# Patient Record
Sex: FEMALE | Race: BLACK OR AFRICAN AMERICAN | NOT HISPANIC OR LATINO | Employment: FULL TIME | URBAN - METROPOLITAN AREA
[De-identification: names, ages, dates, MRNs, and addresses within clinical notes are randomized per-mention and may not be internally consistent; named-entity substitution may affect disease eponyms.]

---

## 2024-05-30 ENCOUNTER — OFFICE VISIT (OUTPATIENT)
Dept: OBSTETRICS AND GYNECOLOGY | Facility: CLINIC | Age: 54
End: 2024-05-30
Payer: COMMERCIAL

## 2024-05-30 VITALS
DIASTOLIC BLOOD PRESSURE: 77 MMHG | HEART RATE: 91 BPM | WEIGHT: 209 LBS | HEIGHT: 61 IN | BODY MASS INDEX: 39.46 KG/M2 | SYSTOLIC BLOOD PRESSURE: 128 MMHG

## 2024-05-30 DIAGNOSIS — N32.81 OVERACTIVE BLADDER: ICD-10-CM

## 2024-05-30 DIAGNOSIS — N39.9 URINARY DISORDER: Primary | ICD-10-CM

## 2024-05-30 DIAGNOSIS — Z12.31 VISIT FOR SCREENING MAMMOGRAM: ICD-10-CM

## 2024-05-30 LAB
POC APPEARANCE, URINE: CLEAR
POC BILIRUBIN, URINE: NEGATIVE
POC BLOOD, URINE: NEGATIVE
POC COLOR, URINE: YELLOW
POC GLUCOSE, URINE: NEGATIVE MG/DL
POC KETONES, URINE: NEGATIVE MG/DL
POC LEUKOCYTES, URINE: NEGATIVE
POC NITRITE,URINE: NEGATIVE
POC PH, URINE: 6 PH
POC PROTEIN, URINE: NEGATIVE MG/DL
POC SPECIFIC GRAVITY, URINE: 1.02
POC UROBILINOGEN, URINE: 0.2 EU/DL

## 2024-05-30 PROCEDURE — 51798 US URINE CAPACITY MEASURE: CPT | Performed by: OBSTETRICS & GYNECOLOGY

## 2024-05-30 PROCEDURE — 99204 OFFICE O/P NEW MOD 45 MIN: CPT | Performed by: OBSTETRICS & GYNECOLOGY

## 2024-05-30 PROCEDURE — 99214 OFFICE O/P EST MOD 30 MIN: CPT | Performed by: OBSTETRICS & GYNECOLOGY

## 2024-05-30 PROCEDURE — 81003 URINALYSIS AUTO W/O SCOPE: CPT | Performed by: OBSTETRICS & GYNECOLOGY

## 2024-05-30 RX ORDER — ESOMEPRAZOLE MAGNESIUM 40 MG/1
CAPSULE, DELAYED RELEASE ORAL
COMMUNITY
Start: 2015-04-16

## 2024-05-30 RX ORDER — FLUTICASONE PROPIONATE 50 MCG
2 SPRAY, SUSPENSION (ML) NASAL DAILY
COMMUNITY
Start: 2024-05-21

## 2024-05-30 RX ORDER — AZELASTINE HYDROCHLORIDE 0.5 MG/ML
SOLUTION/ DROPS OPHTHALMIC
COMMUNITY
Start: 2024-05-21

## 2024-05-30 RX ORDER — OMEPRAZOLE 40 MG/1
CAPSULE, DELAYED RELEASE ORAL
COMMUNITY

## 2024-05-30 RX ORDER — SOLIFENACIN SUCCINATE 5 MG/1
5 TABLET, FILM COATED ORAL DAILY
Qty: 30 TABLET | Refills: 3 | Status: SHIPPED | OUTPATIENT
Start: 2024-05-30 | End: 2025-05-30

## 2024-05-30 ASSESSMENT — PAIN SCALES - GENERAL: PAINLEVEL: 0-NO PAIN

## 2024-05-31 ASSESSMENT — ENCOUNTER SYMPTOMS
GASTROINTESTINAL NEGATIVE: 1
RESPIRATORY NEGATIVE: 1
EYES NEGATIVE: 1
UNEXPECTED WEIGHT CHANGE: 1
ENDOCRINE NEGATIVE: 1
MUSCULOSKELETAL NEGATIVE: 1
PSYCHIATRIC NEGATIVE: 1
CARDIOVASCULAR NEGATIVE: 1
NEUROLOGICAL NEGATIVE: 1

## 2024-05-31 NOTE — PROGRESS NOTES
Urogynecology  Provider:  Cari Mendoza MD  [phone]    ASSESSMENT AND PLAN:   53 year old female with OAB.     Diagnoses:  #1 Overactive bladder   #2 Breast cancer screening   #3 Myofascial pelvic pain    Plan:  1. OAB  - PVR = 5mL by bladder U/S.   - Rx for Solifenacin 5 mg sent to the patient's preferred pharmacy.  - Discussed potential side effects of Solifenacin, including dry mouth and constipation.  - Advised to continue PFPT to strengthen the pelvic floor.  - We discussed alternative treatment methods like intradetrusor Botox injections and SNM.    2. Breast cancer screening   - Bilateral mammogram with tomosynthesis requisition sent today and this order expires by 7/30/2025.     3. Myofascial pelvic pain  - We discussed the nature of her myofascial pelvic pain.  - Continue with PFPT.    Follow up in 2 months for a medication review and annual exam, including a Pap smear with Tanika JASSO-CNP.    Scribe Attestation  By signing my name below, IMichael Scribe, attest that this documentation has been prepared under the direction and in the presence of Cari Mendoza MD on 05/30/2024 at 4:35 PM.     Agree with above. I Dr. Mendoza, personally performed the services described in the documentation which was scribed virtually and confirm it is both complete and accurate.  Cari Mendoza MD        Problem List Items Addressed This Visit    None  Visit Diagnoses       Urinary disorder    -  Primary    Relevant Orders    Measure post void residual (Completed)    POCT UA Automated manually resulted (Completed)    Visit for screening mammogram        Relevant Orders    BI mammo bilateral screening tomosynthesis    Overactive bladder        Relevant Medications    solifenacin (Vesicare) 5 mg tablet                I spent a total of 45 minutes in face to face and non face to face time. Self referred. Saw us last in 2017.     Cari Mendoza MD      HISTORY OF PRESENT ILLNESS:   53  year old female presenting as a new patient referral for urinary incontinence.     Interval History:  - She was previously seen by Dr. Lyn who referred her to this clinic.    - She mentioned she recently started Saxenda for weight loss and noted that a previous weight loss medication, Phentermine, seemed to reduce her urgency sx.     Urinary Symptoms:   - She has issues with ongoing urinary urgency.  - She reports that the urgency has not worsened but remains a significant issue.  - She describes the urgency as a sudden, intense need to urinate, often triggered by activities such as carrying groceries.  - She often has to rush to the bathroom and sometimes does not make it in time.  - She has a hx of similar symptoms dating back to , when she was initially evaluated and treated with PFPT due to a tight pelvic floor.  - She has been managing her sx without medication but is now considering pharmacological intervention due to the persistent nature of her sx.     Past Medical History:  - Bedwetting as a child.  - Heartburn.    Past Surgical History:  -  section.  - Gallbladder removal.  - D&C.  - Urethral dilation.  - Esophageal dilation.  - One surgical termination.    Family History:  - Sister with addiction issues.  - No significant family hx of urinary or pelvic floor disorders.    Social History:  - Works as a .  - Lives with nieces, whom she is raising due to her sister's addiction issues.  - Drinks 1 cup of coffee and 3 bottles of water today.    OBGYN History Sexual Activity:   - She had 3 pregnancies and 1 vaginal delivery.  - She is not currently sexually active.       Past Medical History:    - Takes Saxenda for weight loss   Past Medical History:   Diagnosis Date    Allergy status to unspecified drugs, medicaments and biological substances     History of seasonal allergies    Personal history of other diseases of the digestive system     History of gastroesophageal reflux  "(GERD)       Past Surgical History:     Past Surgical History:   Procedure Laterality Date     SECTION, LOW TRANSVERSE  2015     Section Low Transverse    CHOLECYSTECTOMY  2015    Cholecystectomy Laparoscopic    DILATION AND CURETTAGE OF UTERUS  2015    Dilation And Curettage    GALLBLADDER SURGERY  2018    Gallbladder Surgery    OTHER SURGICAL HISTORY  2015    Surgically Induced  - By Dilation And Curettage    OTHER SURGICAL HISTORY  2015    Esophageal Dilation         Medications:       Prior to Admission medications    Not on File        ROS  Review of Systems   Constitutional:  Positive for unexpected weight change.   HENT: Negative.     Eyes: Negative.    Respiratory: Negative.     Cardiovascular: Negative.    Gastrointestinal: Negative.    Endocrine: Negative.    Genitourinary:  Positive for urgency.   Musculoskeletal: Negative.    Allergic/Immunologic: Positive for environmental allergies.   Neurological: Negative.    Psychiatric/Behavioral: Negative.            PHYSICAL EXAM:      /77   Pulse 91   Ht 1.549 m (5' 1\")   Wt 94.8 kg (209 lb)   BMI 39.49 kg/m²      No LMP recorded.      Declines chaperone for physical exam.    PVR= 5 mL US    Well developed, well nourished, in no apparent distress.   Neurologic/Psychiatric:  Awake, Alert and Oriented times 3.  Affect normal. Normal cranial nerves  Pulm: breathing without effort  Sexual maturity: Angelito stage V  Abd exam: soft, non-tender      GENITAL/URINARY:       External Genitalia:  The patient has normal appearing external genitalia, normal skenes and bartholins glands, and a normal hair distribution.  Her vulva is without lesions, erythema or discharge.  It is non-tender with appropriate sensation.     Urethral Meatus:  Size normal, Location normal, Lesions absent, Prolapse absent,      Urethra:  Fullness absent, Masses absent,      Bladder:  Fullness absent, Masses absent, Tenderness " absent,      Vagina:  General appearance normal, Estrogen effect normal, Discharge absent, Lesions absent,    NO POP  Cervix: Normal, no discharge.   Uterus:  normal size and mobile  Adnexa:   bilateral normal    Anus/Perineum:  Lesions absent and Masses absent normal sphincter tone, no lesions  No Hemorrhoids, Normal Perineum      She does have myofascial tenderness on exam.         Cari Mendoza MD

## 2024-06-13 ENCOUNTER — OFFICE VISIT (OUTPATIENT)
Dept: OBSTETRICS AND GYNECOLOGY | Facility: CLINIC | Age: 54
End: 2024-06-13
Payer: COMMERCIAL

## 2024-06-13 VITALS
HEART RATE: 84 BPM | WEIGHT: 208 LBS | DIASTOLIC BLOOD PRESSURE: 63 MMHG | SYSTOLIC BLOOD PRESSURE: 100 MMHG | HEIGHT: 61 IN | BODY MASS INDEX: 39.27 KG/M2

## 2024-06-13 DIAGNOSIS — Z01.419 WELL WOMAN EXAM: Primary | ICD-10-CM

## 2024-06-13 DIAGNOSIS — N32.81 OVERACTIVE BLADDER: ICD-10-CM

## 2024-06-13 DIAGNOSIS — Z12.4 CERVICAL CANCER SCREENING: ICD-10-CM

## 2024-06-13 PROCEDURE — 99396 PREV VISIT EST AGE 40-64: CPT | Performed by: NURSE PRACTITIONER

## 2024-06-13 ASSESSMENT — ENCOUNTER SYMPTOMS
ENDOCRINE NEGATIVE: 1
GASTROINTESTINAL NEGATIVE: 1
PSYCHIATRIC NEGATIVE: 1
CONSTITUTIONAL NEGATIVE: 1
EYES NEGATIVE: 1
NEUROLOGICAL NEGATIVE: 1
RESPIRATORY NEGATIVE: 1
CARDIOVASCULAR NEGATIVE: 1
MUSCULOSKELETAL NEGATIVE: 1

## 2024-06-13 ASSESSMENT — PAIN SCALES - GENERAL: PAINLEVEL: 0-NO PAIN

## 2024-06-13 NOTE — PROGRESS NOTES
"Martina Swanson \"Jeor" is a 53 y.o. female who is here for a routine exam.        HPI    Records Review:  - Saw Dr. Mendoza May 30, 2024 for OAB. She was prescribed Solifenacin 5mg. She was doing PFPT.     Urinary Symptoms:  - She has been undergoing PFPT, which has been beneficial.  - She reports that her bladder sx have improved significantly with the medication, particularly noting a reduction in nocturia and urgency.  - She denies any issues with dry mouth, although she is taking Saxenda for weight management, which she started recently.  - She mentions retaining water and has been referred to a cardiologist after an EKG showed a minor irregularity.  - She denies incontinence severe enough to wear pads.    GYN Symptoms:  - She experiences occasional night sweats and hot flashes, but she manages them without intervention.  - She is post-menopausal and not currently sexually active.  - She denies any vaginal dryness or irritation.  - She denies any breast issues.    Review of Systems   Constitutional: Negative.    HENT: Negative.     Eyes: Negative.    Respiratory: Negative.     Cardiovascular: Negative.    Gastrointestinal: Negative.    Endocrine: Negative.    Genitourinary:         Improved bladder control, no nocturia, no urgency, no incontinence requiring pads   Musculoskeletal: Negative.    Neurological: Negative.    Psychiatric/Behavioral: Negative.         Objective   /63   Pulse 84   Ht 1.549 m (5' 1\")   Wt 94.3 kg (208 lb)   BMI 39.30 kg/m²     General:   alert, appears stated age, and cooperative   Heart: regular rate and rhythm, S1, S2 normal, no murmur, click, rub or gallop   Lungs: clear to auscultation bilaterally   Abdomen: soft, non-tender, without masses or organomegaly   Vulva: normal   Vagina: normal mucosa, normal discharge   Cervix: Appears closed   Uterus: normal size, mobile   Adnexa: normal adnexa     Assessment/Plan   53 y.o. female presenting for yearly " exam.    Diagnoses:  #1 OAB  #2 Well woman exam    Plan:  OAB  - Sx well-controlled with Solifenacin 5 mg. Continue current medication.    2. Well-woman exam  - Mammogram order previously sent by Dr. Mendoza.  - Speculum exam performed and pap collected today. We will call the patient in 2+ weeks with the cytology report if results are abnormal and discussed that results will be uploaded to the patient portal to review after they have been resulted by the pathologist.     Return in 1 year for next annual exam with JOSE F Staton.    Scribe Attestation  By signing my name below, Michael TORRES Scribe, attest that this documentation has been prepared under the direction and in the presence of JOSE F Flannery on 06/13/2024 at 1:43 PM.    I, JOSE F Flannery, personally performed the services described in this documentation which was scribed virtually and I confirm that it is both accurate and complete.       JOSE F Flannery

## 2024-06-26 LAB
CYTOLOGY CMNT CVX/VAG CYTO-IMP: NORMAL
HPV HR 12 DNA GENITAL QL NAA+PROBE: NEGATIVE
HPV HR GENOTYPES PNL CVX NAA+PROBE: NEGATIVE
HPV16 DNA SPEC QL NAA+PROBE: NEGATIVE
HPV18 DNA SPEC QL NAA+PROBE: NEGATIVE
LAB AP HPV GENOTYPE QUESTION: YES
LAB AP HPV HR: NORMAL
LABORATORY COMMENT REPORT: NORMAL
MENSTRUAL HX REPORTED: NORMAL
PATH REPORT.RELEVANT HX SPEC: NORMAL
PATH REPORT.TOTAL CANCER: NORMAL

## 2024-06-27 ENCOUNTER — HOSPITAL ENCOUNTER (OUTPATIENT)
Dept: RADIOLOGY | Facility: CLINIC | Age: 54
Discharge: HOME | End: 2024-06-27
Payer: COMMERCIAL

## 2024-06-27 VITALS — WEIGHT: 209 LBS | BODY MASS INDEX: 39.46 KG/M2 | HEIGHT: 61 IN

## 2024-06-27 DIAGNOSIS — Z12.31 VISIT FOR SCREENING MAMMOGRAM: ICD-10-CM

## 2024-06-27 PROCEDURE — 77067 SCR MAMMO BI INCL CAD: CPT

## 2024-07-29 ENCOUNTER — APPOINTMENT (OUTPATIENT)
Dept: OBSTETRICS AND GYNECOLOGY | Facility: CLINIC | Age: 54
End: 2024-07-29
Payer: COMMERCIAL

## 2024-11-19 DIAGNOSIS — N32.81 OVERACTIVE BLADDER: ICD-10-CM

## 2024-11-19 NOTE — TELEPHONE ENCOUNTER
Request received for   Requested Prescriptions     Pending Prescriptions Disp Refills    solifenacin (VESIcare) 5 mg tablet [Pharmacy Med Name: Solifenacin Succinate Oral Tablet 5 MG] 30 tablet 0     Sig: TAKE 1 TABLET BY MOUTH ONCE DAILY (SWALLOW WHOLE. DO NOT CRUSH, CHEW OR SPLIT)       Mihaela Swanson was last seen 6/13/2024.

## 2024-11-20 RX ORDER — SOLIFENACIN SUCCINATE 5 MG/1
TABLET, FILM COATED ORAL
Qty: 30 TABLET | Refills: 0 | Status: SHIPPED | OUTPATIENT
Start: 2024-11-20

## 2025-04-29 DIAGNOSIS — N32.81 OVERACTIVE BLADDER: ICD-10-CM

## 2025-04-29 RX ORDER — SOLIFENACIN SUCCINATE 5 MG/1
TABLET, FILM COATED ORAL
Qty: 30 TABLET | Refills: 0 | Status: SHIPPED | OUTPATIENT
Start: 2025-04-29

## 2025-04-29 NOTE — TELEPHONE ENCOUNTER
Request received for   Requested Prescriptions     Pending Prescriptions Disp Refills    solifenacin (VESIcare) 5 mg tablet [Pharmacy Med Name: Solifenacin Succinate Oral Tablet 5 MG] 30 tablet 0     Sig: TAKE ONE TABLET BY MOUTH ONCE DAILY (SWALLOW WHOLE. DO NOT CRUSH, CHEW OR SPLIT)       Mihaela Swanson was last seen 06/13/2024.

## 2025-06-12 DIAGNOSIS — N32.81 OVERACTIVE BLADDER: ICD-10-CM

## 2025-06-16 ENCOUNTER — APPOINTMENT (OUTPATIENT)
Dept: OBSTETRICS AND GYNECOLOGY | Facility: CLINIC | Age: 55
End: 2025-06-16
Payer: COMMERCIAL

## 2025-06-19 RX ORDER — SOLIFENACIN SUCCINATE 5 MG/1
TABLET, FILM COATED ORAL
Qty: 30 TABLET | Refills: 0 | Status: SHIPPED | OUTPATIENT
Start: 2025-06-19

## 2025-06-23 ENCOUNTER — APPOINTMENT (OUTPATIENT)
Dept: OBSTETRICS AND GYNECOLOGY | Facility: CLINIC | Age: 55
End: 2025-06-23
Payer: COMMERCIAL

## 2025-06-23 VITALS
BODY MASS INDEX: 33.46 KG/M2 | SYSTOLIC BLOOD PRESSURE: 111 MMHG | WEIGHT: 177.2 LBS | HEIGHT: 61 IN | DIASTOLIC BLOOD PRESSURE: 73 MMHG | HEART RATE: 83 BPM

## 2025-06-23 DIAGNOSIS — N32.81 OVERACTIVE BLADDER: ICD-10-CM

## 2025-06-23 DIAGNOSIS — Z12.31 BREAST CANCER SCREENING BY MAMMOGRAM: ICD-10-CM

## 2025-06-23 DIAGNOSIS — Z01.419 WELL WOMAN EXAM: Primary | ICD-10-CM

## 2025-06-23 PROCEDURE — 99396 PREV VISIT EST AGE 40-64: CPT | Performed by: NURSE PRACTITIONER

## 2025-06-23 PROCEDURE — 3008F BODY MASS INDEX DOCD: CPT | Performed by: NURSE PRACTITIONER

## 2025-06-23 RX ORDER — SOLIFENACIN SUCCINATE 5 MG/1
5 TABLET, FILM COATED ORAL DAILY
Qty: 90 TABLET | Refills: 3 | Status: SHIPPED | OUTPATIENT
Start: 2025-06-23 | End: 2026-06-23

## 2025-06-23 ASSESSMENT — ENCOUNTER SYMPTOMS
ALLERGIC/IMMUNOLOGIC NEGATIVE: 1
ENDOCRINE NEGATIVE: 1
HEMATOLOGIC/LYMPHATIC NEGATIVE: 1
NEUROLOGICAL NEGATIVE: 1
CONSTITUTIONAL NEGATIVE: 1
MUSCULOSKELETAL NEGATIVE: 1
CARDIOVASCULAR NEGATIVE: 1
EYES NEGATIVE: 1
RESPIRATORY NEGATIVE: 1
GASTROINTESTINAL NEGATIVE: 1
PSYCHIATRIC NEGATIVE: 1

## 2025-06-23 ASSESSMENT — PAIN SCALES - GENERAL: PAINLEVEL_OUTOF10: 0-NO PAIN

## 2025-06-23 NOTE — PROGRESS NOTES
"Martina Swanson \"Jero" is a 54 y.o. female who is here for a routine exam.     Current contraception: abstinence  History of abnormal Pap smear: no  Family history of uterine or ovarian cancer: no  History of abnormal mammogram: no  Family history of breast cancer: yes - mother's sister  History of abnormal lipids: no  Menstrual History:  OB History          1    Para   1    Term   1            AB        Living             SAB        IAB        Ectopic        Multiple        Live Births                    No LMP recorded. Patient is perimenopausal.       HPI  Reports Solifenacin continues to be helpful at managing urinary urgency. Denies vaginal burning or itching.     Records Review  - Hx of OAB, Solifenacin 5 mg was helpful during last visit in 2024.  - 2024 normal mammogram and pap smear.      Review of Systems   Constitutional: Negative.    HENT: Negative.     Eyes: Negative.    Respiratory: Negative.     Cardiovascular: Negative.    Gastrointestinal: Negative.    Endocrine: Negative.    Genitourinary: Negative.    Musculoskeletal: Negative.    Skin: Negative.    Allergic/Immunologic: Negative.    Neurological: Negative.    Hematological: Negative.    Psychiatric/Behavioral: Negative.         Objective   /73 (BP Location: Left arm, Patient Position: Sitting)   Pulse 83   Ht 1.549 m (5' 1\")   Wt 80.4 kg (177 lb 3.2 oz)   BMI 33.48 kg/m²     Physical Exam  Constitutional:       Appearance: Normal appearance.   Genitourinary:      Vulva, bladder and urethral meatus normal.      No vaginal tenderness.        Right Adnexa: no mass present.     Left Adnexa: no mass present.     No cervical motion tenderness.      Uterus is not enlarged or fixed.      Pelvic exam was performed with patient in the lithotomy position.   Breasts:     Breasts are symmetrical.      Right: Normal. No mass.      Left: Normal. No mass.   HENT:      Head: Normocephalic and atraumatic. "   Neurological:      General: No focal deficit present.      Mental Status: She is alert and oriented to person, place, and time.   Psychiatric:         Mood and Affect: Mood normal.         Behavior: Behavior normal.         Thought Content: Thought content normal.         Judgment: Judgment normal.         Assessment/Plan   54 y.o. female with OAB presents for breast cancer screening by mammogram and well woman exam.     Diagnosis List:  #1 Breast cancer screening by mammogram  #2 OAB  #3 Well woman exam    Plan:  1. Breast cancer screening by mammogram  - Placed requisition for her annual bilateral mammogram with tomosynthesis.     2. OAB  - Renewed Solifenacin 5 mg, take 1 tablet (5 mg) by mouth once daily. Swallow tablet whole; do not crush, chew, or split.   - Follow up in Urogynecology.     3. Well woman exam  - Pelvic and breast exams with unremarkable findings.   - Follow up in Urogynecology.     Follow up in 1 year with JOSE F Flannery.      Scribe Attestation  By signing my name below, IMaria Isabel Scribe, attest that this documentation has been prepared under the direction and in the presence of JOSE F Flannery on 06/23/2025 at 6:38 PM.     SPEKE audio duration: 24 minutes    I spent a total of 30 minutes in face to face and non face to face time.

## 2025-07-03 ENCOUNTER — HOSPITAL ENCOUNTER (OUTPATIENT)
Dept: RADIOLOGY | Facility: CLINIC | Age: 55
Discharge: HOME | End: 2025-07-03
Payer: COMMERCIAL

## 2025-07-03 VITALS — HEIGHT: 61 IN | BODY MASS INDEX: 33.47 KG/M2 | WEIGHT: 177.25 LBS

## 2025-07-03 DIAGNOSIS — Z12.31 BREAST CANCER SCREENING BY MAMMOGRAM: ICD-10-CM

## 2025-07-03 PROCEDURE — 77063 BREAST TOMOSYNTHESIS BI: CPT
